# Patient Record
Sex: FEMALE | Race: WHITE | NOT HISPANIC OR LATINO | Employment: FULL TIME | URBAN - METROPOLITAN AREA
[De-identification: names, ages, dates, MRNs, and addresses within clinical notes are randomized per-mention and may not be internally consistent; named-entity substitution may affect disease eponyms.]

---

## 2018-04-12 ENCOUNTER — APPOINTMENT (OUTPATIENT)
Dept: WOMENS IMAGING | Facility: HOSPITAL | Age: 48
End: 2018-04-12

## 2018-04-14 ENCOUNTER — APPOINTMENT (OUTPATIENT)
Dept: WOMENS IMAGING | Facility: HOSPITAL | Age: 48
End: 2018-04-14

## 2018-04-14 PROCEDURE — 77067 SCR MAMMO BI INCL CAD: CPT | Performed by: RADIOLOGY

## 2018-04-14 PROCEDURE — 77063 BREAST TOMOSYNTHESIS BI: CPT | Performed by: RADIOLOGY

## 2022-09-15 ENCOUNTER — OFFICE VISIT (OUTPATIENT)
Dept: CARDIOLOGY | Facility: CLINIC | Age: 52
End: 2022-09-15

## 2022-09-15 ENCOUNTER — TELEPHONE (OUTPATIENT)
Dept: CARDIOLOGY | Facility: CLINIC | Age: 52
End: 2022-09-15

## 2022-09-15 VITALS
DIASTOLIC BLOOD PRESSURE: 89 MMHG | HEIGHT: 64 IN | BODY MASS INDEX: 31.28 KG/M2 | WEIGHT: 183.2 LBS | OXYGEN SATURATION: 98 % | SYSTOLIC BLOOD PRESSURE: 144 MMHG | HEART RATE: 88 BPM

## 2022-09-15 DIAGNOSIS — R06.02 SHORTNESS OF BREATH: Primary | ICD-10-CM

## 2022-09-15 DIAGNOSIS — R53.83 OTHER FATIGUE: ICD-10-CM

## 2022-09-15 DIAGNOSIS — Z82.49 FAMILY HISTORY OF PREMATURE CAD: ICD-10-CM

## 2022-09-15 PROCEDURE — 99204 OFFICE O/P NEW MOD 45 MIN: CPT | Performed by: PHYSICIAN ASSISTANT

## 2022-09-15 PROCEDURE — 93000 ELECTROCARDIOGRAM COMPLETE: CPT | Performed by: PHYSICIAN ASSISTANT

## 2022-09-15 RX ORDER — ALBUTEROL SULFATE 90 UG/1
2 AEROSOL, METERED RESPIRATORY (INHALATION) 4 TIMES DAILY PRN
COMMUNITY
Start: 2022-07-27

## 2022-09-15 RX ORDER — TIOTROPIUM BROMIDE INHALATION SPRAY 1.56 UG/1
2 SPRAY, METERED RESPIRATORY (INHALATION) DAILY
COMMUNITY
Start: 2022-09-06

## 2022-09-15 NOTE — PROGRESS NOTES
Subjective   Norbert Barros is a 51 y.o. female     Chief Complaint   Patient presents with   • Establish Care     Cardiac eval, family hx of CHF   • Shortness of Breath       HPI  The patient presents in the clinic today for initial evaluation.  The patient is referred through her pulmonology team because of dyspnea and benign pulmonology work-up.  Apparently PFTs, chest x-ray, and radiologic studies otherwise have been benign.  She was recommended to pursue cardiac evaluation.  She is also concerned because of family history of CAD and congestive heart failure.  In this setting, we have been asked to see and evaluate her.  Her dyspnea is limiting only at moderate to higher levels of exertion.  She reports no chest discomfort or tightness.  She has no PND nor orthopnea.  She has stable lower extremity edema.  She denies dizziness or syncope.  Exercise capacity remains adequate by her report.  She has no further complaints at this time.  She does follow with her primary care provider for routine laboratories.  She feels that those have been mostly normal.  She has no further complaints at this time.      Current Outpatient Medications   Medication Sig Dispense Refill   • albuterol sulfate  (90 Base) MCG/ACT inhaler Inhale 2 puffs 4 (Four) Times a Day As Needed.     • Spiriva Respimat 1.25 MCG/ACT aerosol solution inhaler Inhale 2 puffs Daily.       No current facility-administered medications for this visit.       Naproxen    Past Medical History:   Diagnosis Date   • COVID-19 01/2022   • Plantar fasciitis        Social History     Socioeconomic History   • Marital status: Single   Tobacco Use   • Smoking status: Former Smoker     Packs/day: 0.50     Years: 25.00     Pack years: 12.50     Quit date: 2012     Years since quitting: 10.7   • Smokeless tobacco: Never Used   Substance and Sexual Activity   • Alcohol use: Never   • Drug use: Never       Family History   Problem Relation Age of Onset   • Heart  "failure Mother    • Heart failure Father    • Heart failure Sister    • Heart failure Brother        Review of Systems   Constitutional: Positive for chills (side effect of inhaler) and diaphoresis (night sweats). Negative for fatigue and fever.   HENT: Negative.    Eyes: Negative.  Negative for visual disturbance.   Respiratory: Positive for shortness of breath (better with Spiriva) and wheezing (better with inhalers). Negative for apnea, cough and chest tightness.    Cardiovascular: Negative.  Negative for chest pain, palpitations and leg swelling.   Gastrointestinal: Negative.  Negative for abdominal pain, blood in stool, constipation, diarrhea, nausea and vomiting.   Endocrine: Negative.    Genitourinary: Negative.  Negative for hematuria.   Musculoskeletal: Positive for gait problem (Plantar fasciitis) and myalgias. Negative for arthralgias, back pain and neck pain.   Skin: Negative.    Allergic/Immunologic: Negative.  Negative for environmental allergies and food allergies.   Neurological: Negative for dizziness, syncope, weakness, light-headedness, numbness and headaches.   Hematological: Negative.  Does not bruise/bleed easily.   Psychiatric/Behavioral: Positive for sleep disturbance (muscle cramps and night sweats).       Objective     Vitals:    09/15/22 1404   BP: 144/89   BP Location: Right arm   Patient Position: Sitting   Pulse: 88   SpO2: 98%   Weight: 83.1 kg (183 lb 3.2 oz)   Height: 162.6 cm (64\")        /89 (BP Location: Right arm, Patient Position: Sitting)   Pulse 88   Ht 162.6 cm (64\")   Wt 83.1 kg (183 lb 3.2 oz)   SpO2 98%   BMI 31.45 kg/m²      Lab Results (most recent)     None          Physical Exam  Vitals and nursing note reviewed.   Constitutional:       General: She is not in acute distress.     Appearance: She is well-developed.   HENT:      Head: Normocephalic and atraumatic.   Eyes:      Conjunctiva/sclera: Conjunctivae normal.      Pupils: Pupils are equal, round, and " reactive to light.   Neck:      Vascular: No JVD.      Trachea: No tracheal deviation.   Cardiovascular:      Rate and Rhythm: Normal rate and regular rhythm.      Heart sounds: Normal heart sounds.   Pulmonary:      Effort: Pulmonary effort is normal.      Breath sounds: Normal breath sounds.   Abdominal:      General: Bowel sounds are normal. There is no distension.      Palpations: Abdomen is soft. There is no mass.      Tenderness: There is no abdominal tenderness. There is no guarding or rebound.   Musculoskeletal:         General: No tenderness or deformity. Normal range of motion.      Cervical back: Normal range of motion and neck supple.   Skin:     General: Skin is warm and dry.      Coloration: Skin is not pale.      Findings: No erythema or rash.   Neurological:      Mental Status: She is alert and oriented to person, place, and time.   Psychiatric:         Behavior: Behavior normal.         Thought Content: Thought content normal.         Judgment: Judgment normal.         Procedure     ECG 12 Lead    Date/Time: 9/15/2022 2:08 PM  Performed by: Golden Centeno PA  Authorized by: Golden Centeno PA   Previous ECG: no previous ECG available  Comments: Sinus rhythm, rate 80, minor nonspecific ST-T wave changes, no acute changes noted.                 Assessment & Plan      Diagnosis Plan   1. Shortness of breath  Adult Transthoracic Echo Complete W/ Cont if Necessary Per Protocol    Treadmill Stress Test   2. Other fatigue  Adult Transthoracic Echo Complete W/ Cont if Necessary Per Protocol    Treadmill Stress Test   3. Family history of premature CAD  Adult Transthoracic Echo Complete W/ Cont if Necessary Per Protocol    Treadmill Stress Test     1.  At this time, the patient is referred for evaluation of dyspnea.  Pulmonology evaluation apparently was benign.  Cardiac evaluation has now been requested.    2.  With family history and symptoms otherwise, I would schedule for a regular treadmill stress  test for ischemia assessment and risk stratification otherwise.    3.  I would schedule for an echo as well.  We can evaluate LV size and function, valvular morphologies, and cardiac structure otherwise.    4.  I would make no adjustments in medications.  We will see the patient back to review study results and recommended further at that time.  She will call for any ongoing issues.           Norbert Papo  reports that she quit smoking about 10 years ago. She has a 12.50 pack-year smoking history. She has never used smokeless tobacco..          Electronically signed by:

## 2022-09-15 NOTE — TELEPHONE ENCOUNTER
Pt in office for a new patient appointment. I was able to verify her anthem via telephone, but her secondary insurance I was unable to verify. I contacted [employer] and their VM line for eligibility said no one was available and to LVM. I [facility] requesting a call back to verify pt has active coverage.       I informed pt that I did leave a message for her secondary insurance, but that I am unable to confirm it at this time. She verbalized understanding.

## 2022-11-29 ENCOUNTER — HOSPITAL ENCOUNTER (OUTPATIENT)
Dept: CARDIOLOGY | Facility: HOSPITAL | Age: 52
Discharge: HOME | End: 2022-11-29

## 2022-11-29 DIAGNOSIS — R53.83 OTHER FATIGUE: ICD-10-CM

## 2022-11-29 DIAGNOSIS — Z82.49 FAMILY HISTORY OF PREMATURE CAD: ICD-10-CM

## 2022-11-29 DIAGNOSIS — R06.02 SHORTNESS OF BREATH: ICD-10-CM

## 2022-11-29 LAB
BH CV STRESS DURATION MIN STAGE 1: 3
BH CV STRESS DURATION SEC STAGE 1: 0
BH CV STRESS GRADE STAGE 1: 10
BH CV STRESS METS STAGE 1: 5
BH CV STRESS PROTOCOL 1: NORMAL
BH CV STRESS RECOVERY BP: NORMAL MMHG
BH CV STRESS RECOVERY HR: 93 BPM
BH CV STRESS SPEED STAGE 1: 1.7
BH CV STRESS STAGE 1: 1
MAXIMAL PREDICTED HEART RATE: 167 BPM
PERCENT MAX PREDICTED HR: 97.01 %
STRESS BASELINE BP: NORMAL MMHG
STRESS BASELINE HR: 81 BPM
STRESS PERCENT HR: 114 %
STRESS POST ESTIMATED WORKLOAD: 7 METS
STRESS POST EXERCISE DUR MIN: 6 MIN
STRESS POST EXERCISE DUR SEC: 1 SEC
STRESS POST PEAK BP: NORMAL MMHG
STRESS POST PEAK HR: 162 BPM
STRESS TARGET HR: 142 BPM

## 2022-11-29 PROCEDURE — 93306 TTE W/DOPPLER COMPLETE: CPT | Performed by: INTERNAL MEDICINE

## 2022-11-29 PROCEDURE — 93306 TTE W/DOPPLER COMPLETE: CPT

## 2022-11-29 PROCEDURE — 93017 CV STRESS TEST TRACING ONLY: CPT

## 2022-11-29 PROCEDURE — 93018 CV STRESS TEST I&R ONLY: CPT | Performed by: INTERNAL MEDICINE

## 2022-12-02 ENCOUNTER — TELEPHONE (OUTPATIENT)
Dept: CARDIOLOGY | Facility: CLINIC | Age: 52
End: 2022-12-02

## 2022-12-02 NOTE — TELEPHONE ENCOUNTER
----- Message from HILDA Shultz sent at 12/2/2022  9:05 AM EST -----  Routine follow-up.  Follow-up sooner for sensed palpitations given telemetry.  ----- Message -----  From: Chava Shipman MD  Sent: 11/29/2022   9:40 PM EST  To: HILDA Shultz        Treadmill Stress Test  Order: [id number]   Status: Final result      Visible to patient: No (not released)      Dx: Shortness of breath; Family history o...      0 Result Notes  Details    Reading Physician Reading Date Result Priority   Chava Shipman MD  [phone] 11/29/2022 Routine     Result Text     •  The patient reported shortness of breath during the stress test.  •  Arrhythmias were not significant during stress.     1.  Adequate functional capacity without chest pain.  The patient exercised 6 minutes on a Raymundo protocol to a heart rate of 162, systolic blood pressure of 210, rate-pressure product of 33,600, and an O2 consumption of 7 METS.  The patient achieved 97% of age adjusted maximum predicted heart rate and described dyspnea in stage II.    2.  Physiologic heart rate and hypertensive blood pressure responses to exercise.    3.  No EKG evidence of ischemia.    4.  Occasional PVCs.  No sustained ectopy or block.

## 2022-12-04 LAB
BH CV ECHO MEAS - ACS: 1.85 CM
BH CV ECHO MEAS - AO MAX PG: 7.4 MMHG
BH CV ECHO MEAS - AO MEAN PG: 3.9 MMHG
BH CV ECHO MEAS - AO ROOT DIAM: 2.8 CM
BH CV ECHO MEAS - AO V2 MAX: 135.7 CM/SEC
BH CV ECHO MEAS - AO V2 VTI: 26.4 CM
BH CV ECHO MEAS - EDV(CUBED): 70.4 ML
BH CV ECHO MEAS - EDV(MOD-SP4): 88.1 ML
BH CV ECHO MEAS - EF(MOD-SP4): 54.9 %
BH CV ECHO MEAS - EF_3D-VOL: 57 %
BH CV ECHO MEAS - ESV(CUBED): 21.3 ML
BH CV ECHO MEAS - ESV(MOD-SP4): 39.7 ML
BH CV ECHO MEAS - FS: 32.9 %
BH CV ECHO MEAS - IVS/LVPW: 0.77 CM
BH CV ECHO MEAS - IVSD: 1 CM
BH CV ECHO MEAS - LA DIMENSION: 3.2 CM
BH CV ECHO MEAS - LAT PEAK E' VEL: 8.3 CM/SEC
BH CV ECHO MEAS - LV DIASTOLIC VOL/BSA (35-75): 46.8 CM2
BH CV ECHO MEAS - LV MASS(C)D: 163.2 GRAMS
BH CV ECHO MEAS - LV SYSTOLIC VOL/BSA (12-30): 21.1 CM2
BH CV ECHO MEAS - LVIDD: 4.1 CM
BH CV ECHO MEAS - LVIDS: 2.8 CM
BH CV ECHO MEAS - LVOT AREA: 3 CM2
BH CV ECHO MEAS - LVOT DIAM: 1.95 CM
BH CV ECHO MEAS - LVPWD: 1.3 CM
BH CV ECHO MEAS - MED PEAK E' VEL: 7.3 CM/SEC
BH CV ECHO MEAS - MV A MAX VEL: 73.7 CM/SEC
BH CV ECHO MEAS - MV DEC SLOPE: 341.5 CM/SEC2
BH CV ECHO MEAS - MV E MAX VEL: 77.6 CM/SEC
BH CV ECHO MEAS - MV E/A: 1.05
BH CV ECHO MEAS - RVDD: 3.1 CM
BH CV ECHO MEAS - SI(MOD-SP4): 25.7 ML/M2
BH CV ECHO MEAS - SV(MOD-SP4): 48.4 ML
BH CV ECHO MEASUREMENTS AVERAGE E/E' RATIO: 9.95
LEFT ATRIUM VOLUME INDEX: 21.2 ML/M2
MAXIMAL PREDICTED HEART RATE: 167 BPM
STRESS TARGET HR: 142 BPM

## 2022-12-08 ENCOUNTER — TELEPHONE (OUTPATIENT)
Dept: CARDIOLOGY | Facility: CLINIC | Age: 52
End: 2022-12-08

## 2022-12-08 NOTE — TELEPHONE ENCOUNTER
For the HUB to read to pt:     ECHO  Called patient to notify of no acute findings or abnormalities. Keep follow up as scheduled. If you have any problem between now and then give our office a call.   ----- Message from Juana Pimentel MA sent at 12/7/2022  9:14 AM EST -----    ----- Message -----  From: Golden Centeno PA  Sent: 12/7/2022   9:00 AM EST  To: Juana Pimentel MA    Routine follow-up.  ----- Message -----  From: Chava Shipman MD  Sent: 12/4/2022   9:20 PM EST  To: HILDA Shultz

## 2022-12-09 ENCOUNTER — OFFICE VISIT (OUTPATIENT)
Dept: CARDIOLOGY | Facility: CLINIC | Age: 52
End: 2022-12-09

## 2022-12-09 VITALS
WEIGHT: 184.2 LBS | OXYGEN SATURATION: 99 % | DIASTOLIC BLOOD PRESSURE: 93 MMHG | BODY MASS INDEX: 31.45 KG/M2 | HEIGHT: 64 IN | SYSTOLIC BLOOD PRESSURE: 156 MMHG | HEART RATE: 82 BPM

## 2022-12-09 DIAGNOSIS — Z82.49 FAMILY HISTORY OF PREMATURE CAD: ICD-10-CM

## 2022-12-09 DIAGNOSIS — R53.83 OTHER FATIGUE: ICD-10-CM

## 2022-12-09 DIAGNOSIS — R06.02 SHORTNESS OF BREATH: Primary | ICD-10-CM

## 2022-12-09 PROCEDURE — 99213 OFFICE O/P EST LOW 20 MIN: CPT | Performed by: PHYSICIAN ASSISTANT

## 2022-12-09 NOTE — PROGRESS NOTES
Problem list     Subjective   Norbert Barros is a 52 y.o. female     Chief Complaint   Patient presents with   • Follow-up     8 month testing F/U       HPI  The patient presents back to clinic today to review study results.  We had seen her initially because of ongoing symptoms.  She had an evaluation through her pulmonology team who did not feel that symptoms were directly related to her pulmonary status.  She was subsequently scheduled for stress and an echo.  Stress test indicated no evidence of ischemia.  Echo indicated preserved systolic function with no significant valvular nor structural abnormalities.  The patient now does well.  Her dyspnea is mostly nonproblematic at this time.  She reports no chest pain.  She has no failure nor dysrhythmic symptoms.  She has no further complaints.    Current Outpatient Medications on File Prior to Visit   Medication Sig Dispense Refill   • albuterol sulfate  (90 Base) MCG/ACT inhaler Inhale 2 puffs 4 (Four) Times a Day As Needed.     • Spiriva Respimat 1.25 MCG/ACT aerosol solution inhaler Inhale 2 puffs Daily.       No current facility-administered medications on file prior to visit.       Naproxen    Past Medical History:   Diagnosis Date   • COVID-19 01/2022   • Plantar fasciitis        Social History     Socioeconomic History   • Marital status: Single   Tobacco Use   • Smoking status: Former     Packs/day: 0.50     Years: 25.00     Pack years: 12.50     Types: Cigarettes     Quit date: 2012     Years since quitting: 10.9   • Smokeless tobacco: Never   Vaping Use   • Vaping Use: Never used   Substance and Sexual Activity   • Alcohol use: Never   • Drug use: Never   • Sexual activity: Defer       Family History   Problem Relation Age of Onset   • Heart failure Mother    • Heart failure Father    • Heart failure Sister    • Heart failure Brother        Review of Systems   Constitutional: Positive for fatigue (Constant).   HENT: Negative.  Negative for  "congestion, rhinorrhea and sore throat.    Eyes: Negative.    Respiratory: Positive for chest tightness (PT states she gets mucus build up in chest and uses inhalers to help clear it), shortness of breath and wheezing.    Cardiovascular: Negative.  Negative for chest pain, palpitations and leg swelling.   Gastrointestinal: Negative.  Negative for abdominal pain, constipation, diarrhea, nausea and vomiting.   Endocrine: Positive for heat intolerance (PT stated she gets hot flashes but mainly at night). Negative for cold intolerance.   Genitourinary: Negative.  Negative for difficulty urinating, frequency and urgency.   Musculoskeletal: Positive for neck pain and neck stiffness. Negative for arthralgias and back pain.   Skin: Negative for rash and wound.   Allergic/Immunologic: Negative.  Negative for environmental allergies.   Neurological: Negative.  Negative for dizziness, weakness and light-headedness.   Hematological: Negative.  Does not bruise/bleed easily.   Psychiatric/Behavioral: Positive for sleep disturbance (Pt stated occassionally she cant sleep and has hot flashes).       Objective   Vitals:    12/09/22 1052   BP: 156/93   Pulse: 82   SpO2: 99%   Weight: 83.6 kg (184 lb 3.2 oz)   Height: 162.6 cm (64.02\")      /93   Pulse 82   Ht 162.6 cm (64.02\")   Wt 83.6 kg (184 lb 3.2 oz)   SpO2 99%   BMI 31.60 kg/m²    Lab Results (most recent)     None        Physical Exam  Vitals and nursing note reviewed.   Constitutional:       General: She is not in acute distress.     Appearance: She is well-developed.   HENT:      Head: Normocephalic and atraumatic.   Eyes:      Conjunctiva/sclera: Conjunctivae normal.      Pupils: Pupils are equal, round, and reactive to light.   Neck:      Vascular: No JVD.      Trachea: No tracheal deviation.   Cardiovascular:      Rate and Rhythm: Normal rate and regular rhythm.      Heart sounds: Normal heart sounds.   Pulmonary:      Effort: Pulmonary effort is normal.      " Breath sounds: Normal breath sounds.   Abdominal:      General: Bowel sounds are normal. There is no distension.      Palpations: Abdomen is soft. There is no mass.      Tenderness: There is no abdominal tenderness. There is no guarding or rebound.   Musculoskeletal:         General: No tenderness or deformity. Normal range of motion.      Cervical back: Normal range of motion and neck supple.   Skin:     General: Skin is warm and dry.      Coloration: Skin is not pale.      Findings: No erythema or rash.   Neurological:      Mental Status: She is alert and oriented to person, place, and time.   Psychiatric:         Behavior: Behavior normal.         Thought Content: Thought content normal.         Judgment: Judgment normal.           Procedure   Procedures       Assessment & Plan      Diagnosis Plan   1. Shortness of breath        2. Other fatigue        3. Family history of premature CAD          1.  At this time, the patient appears to be doing well from cardiovascular standpoint.  Her dyspnea and fatigue are minimal and nonproblematic.  The main concern was symptoms and her family history.  Follow-up stress and echo studies were benign.  I do not feel further work-up would be warranted.  For change in clinical course, she will return to the clinic.    2.  I will continue medications without change.  She is hypertensive today.  She does not check this routinely at home.  She will start monitoring that and call for recurrent issues.    3.  We will continue to see her on a yearly evaluation.                   Electronically signed by:

## 2023-12-06 ENCOUNTER — TELEPHONE (OUTPATIENT)
Dept: CARDIOLOGY | Facility: CLINIC | Age: 53
End: 2023-12-06
Payer: COMMERCIAL